# Patient Record
(demographics unavailable — no encounter records)

---

## 2024-12-10 NOTE — LETTER BODY
[FreeTextEntry1] : Olivia Nieto. -[address] [place] [address]    Dear Dr. Nieto,    Reason for visit: Right renal angiomyolipoma.    This is a 62 year-old woman with right renal angiomyolipoma. Patient underwent a right AML embolization with Dr. Zuñiga. Ultrasound in June 2024 showed her AML size improved from 8 cm to 5 cm now. Patient returns today for followup ultrasound. Ultrasound today again showed a 4 cm right AML. She is doing well. She is asymptomatic. Patient denies any flank pains or fevers or chills. Patient denies any changes in health. Patient denies any gross hematuria. The past medical history, family history and social history are unchanged. All other review of systems are negative. Patient denies any changes in medications. Medication list was reconciled.    On examination, the patient is a healthy-appearing woman in no acute distress. She is alert and oriented follows commands. She has normal mood and affect. She is normocephalic. Neck is supple. Respirations are unlabored. Abdomen is soft and nontender. Liver is not palpable. Bladder is nonpalpable. No CVA tenderness. Neurologically she is grossly intact. No peripheral edema. Skin without gross abnormality.    I personally reviewed the ultrasound scan with patient today. Ultrasound demonstrated 4 cm right angiomyolipoma. This is decreased in size.    Assessment: Right renal angiomyolipoma.    I counseled the patient. Ultrasound today showed AML size improved from 5 cm to 4 cm now. I reassured the patient. Risks and alternatives were discussed. I answered the patient questions. She will follow-up in 1 year with renal ultrasound for surveillance. The patient will follow-up as directed and will contact me with any questions or concerns. Thank you for the opportunity to participate in the care of this patient. I'll keep you updated on her progress.    Plan: Follow-up in 1 year renal ultrasound.  I spent 20-minutes time today on all issues related to this patient on today date of service including non face to face time.

## 2024-12-10 NOTE — ADDENDUM
[FreeTextEntry1] : Entered by Sebastián Brush, acting as scribe for Dr. Giuseppe Parsk. The documentation recorded by the scribe accurately reflects the service I personally performed and the decisions made by me.

## 2024-12-10 NOTE — ADDENDUM
[FreeTextEntry1] : Entered by Sebastián Brush, acting as scribe for Dr. Giuseppe Parks. The documentation recorded by the scribe accurately reflects the service I personally performed and the decisions made by me.

## 2024-12-10 NOTE — LETTER BODY
[FreeTextEntry1] : Olivia Nieto. -[address] [place] [address]    Dear Dr. Nieto,    Reason for visit: Right renal angiomyolipoma.    This is a 62 year-old woman with right renal angiomyolipoma. Patient underwent a right AML embolization with Dr. Zuñiga. Ultrasound in June 2024 showed her AML size improved from 8 cm to 5 cm now. Patient returns today for followup ultrasound. Ultrasound today again showed a 4 cm right AML. She is doing well. She is asymptomatic. Patient denies any flank pains or fevers or chills. Patient denies any changes in health. Patient denies any gross hematuria. The past medical history, family history and social history are unchanged. All other review of systems are negative. Patient denies any changes in medications. Medication list was reconciled.    On examination, the patient is a healthy-appearing woman in no acute distress. She is alert and oriented follows commands. She has normal mood and affect. She is normocephalic. Neck is supple. Respirations are unlabored. Abdomen is soft and nontender. Liver is not palpable. Bladder is nonpalpable. No CVA tenderness. Neurologically she is grossly intact. No peripheral edema. Skin without gross abnormality.    I personally reviewed the ultrasound scan with patient today. Ultrasound demonstrated 4 cm right angiomyolipoma. This is decreased in size.    Assessment: Right renal angiomyolipoma.    I counseled the patient. Ultrasound today showed AML size improved from 5 cm to 4 cm now. I reassured the patient. Risks and alternatives were discussed. I answered the patient questions. She will follow-up in 1 year with renal ultrasound for surveillance. The patient will follow-up as directed and will contact me with any questions or concerns. Thank you for the opportunity to participate in the care of this patient. I'll keep you updated on her progress.    Plan: Follow-up in 1 year renal ultrasound.  I spent 20-minutes time today on all issues related to this patient on today date of service including non face to face time.